# Patient Record
(demographics unavailable — no encounter records)

---

## 2024-10-15 NOTE — HISTORY OF PRESENT ILLNESS
[FreeTextEntry1] : 58-year-old man seen 10/15/2024 with complaint of nocturia 2-3x. Patient reports variable stream improved on tamsulosin 0.4mg. He denies daytime frequency but has occasional urgency without leakage. He denies recent UTI or history of kidney stones. Had chemical exposure during 9/11. Has PSA checked by [employer]. Takes Tadalafil PRN for ED with good results. Random Bladder Scan 72ml.   Denies dysuria, hematuria, lower abdominal or flank pain, fever, chills or rigors.

## 2024-10-15 NOTE — ASSESSMENT
[FreeTextEntry1] : 58-year-old Male with exam and history consistent with BPH with Nocturia and occasional urgency. Symptoms not bothersome. PVR acceptable.  The anatomy of the lower genitourinary tract was explained to the patient, with the urine passing through the bladder neck and prostatic urethra as it exits the body. Prostatic enlargement can constrict the lumen and the bladder outlet, causing incomplete bladder emptying and irritative symptoms such and frequency and urgency. Alpha blockers can be used to relax the bladder neck and facilitate passage of urine from the bladder. 5-alpha reductase inhibitors can be employed to shrink the prostate and thereby allow for passage of urine more easily. Anticholinergics to decrease bladder irritative symptoms were also discussed, but it was stressed that they can increase post void residual and risk urinary retention. -Will send UA and UCx.  -Continue tamsulosin 0.4mg.   For ED, can continue tadalafil PRN.   For PSA Screening, TRISTAN Benign. Patient will send copy of recent PSA level.   Patient will RTO in 1 year or sooner if needed.

## 2024-10-15 NOTE — PHYSICAL EXAM
[Normal Appearance] : normal appearance [Well Groomed] : well groomed [General Appearance - In No Acute Distress] : no acute distress [] : no respiratory distress [Respiration, Rhythm And Depth] : normal respiratory rhythm and effort [Exaggerated Use Of Accessory Muscles For Inspiration] : no accessory muscle use [Abdomen Soft] : soft [Abdomen Tenderness] : non-tender [Costovertebral Angle Tenderness] : no ~M costovertebral angle tenderness [Urinary Bladder Findings] : the bladder was normal on palpation [Prostate Tenderness] : the prostate was not tender [No Prostate Nodules] : no prostate nodules [Normal Station and Gait] : the gait and station were normal for the patient's age [No Focal Deficits] : no focal deficits [Oriented To Time, Place, And Person] : oriented to person, place, and time [Affect] : the affect was normal [Mood] : the mood was normal [Chaperone Present] : A chaperone was present in the examining room during all aspects of the physical examination [FreeTextEntry2] : Kadie Bagley, NP